# Patient Record
Sex: FEMALE | Race: WHITE | NOT HISPANIC OR LATINO | Employment: FULL TIME | URBAN - NONMETROPOLITAN AREA
[De-identification: names, ages, dates, MRNs, and addresses within clinical notes are randomized per-mention and may not be internally consistent; named-entity substitution may affect disease eponyms.]

---

## 2024-10-16 ENCOUNTER — LAB (OUTPATIENT)
Dept: LAB | Facility: HOSPITAL | Age: 54
End: 2024-10-16
Payer: COMMERCIAL

## 2024-10-16 ENCOUNTER — OFFICE VISIT (OUTPATIENT)
Dept: CARDIOLOGY | Facility: CLINIC | Age: 54
End: 2024-10-16
Payer: COMMERCIAL

## 2024-10-16 VITALS
SYSTOLIC BLOOD PRESSURE: 146 MMHG | HEIGHT: 66 IN | WEIGHT: 206 LBS | OXYGEN SATURATION: 97 % | DIASTOLIC BLOOD PRESSURE: 80 MMHG | BODY MASS INDEX: 33.11 KG/M2 | HEART RATE: 93 BPM

## 2024-10-16 DIAGNOSIS — R42 DIZZINESS: ICD-10-CM

## 2024-10-16 DIAGNOSIS — R06.02 SOB (SHORTNESS OF BREATH): ICD-10-CM

## 2024-10-16 DIAGNOSIS — F17.200 SMOKER: ICD-10-CM

## 2024-10-16 DIAGNOSIS — R07.2 PRECORDIAL PAIN: Primary | ICD-10-CM

## 2024-10-16 DIAGNOSIS — R07.2 PRECORDIAL PAIN: ICD-10-CM

## 2024-10-16 DIAGNOSIS — E78.2 MIXED HYPERLIPIDEMIA: ICD-10-CM

## 2024-10-16 LAB — D DIMER PPP FEU-MCNC: 2.61 MCGFEU/ML (ref 0–0.54)

## 2024-10-16 PROCEDURE — 93000 ELECTROCARDIOGRAM COMPLETE: CPT | Performed by: INTERNAL MEDICINE

## 2024-10-16 PROCEDURE — 85379 FIBRIN DEGRADATION QUANT: CPT

## 2024-10-16 PROCEDURE — 36415 COLL VENOUS BLD VENIPUNCTURE: CPT

## 2024-10-16 PROCEDURE — 99204 OFFICE O/P NEW MOD 45 MIN: CPT | Performed by: INTERNAL MEDICINE

## 2024-10-16 RX ORDER — CETIRIZINE HYDROCHLORIDE 10 MG/1
10 TABLET ORAL DAILY
COMMUNITY

## 2024-10-16 RX ORDER — SENNOSIDES 8.6 MG
650 CAPSULE ORAL EVERY 8 HOURS PRN
COMMUNITY

## 2024-10-16 RX ORDER — GINSENG 100 MG
CAPSULE ORAL
COMMUNITY

## 2024-10-16 RX ORDER — COVID-19 ANTIGEN TEST
KIT MISCELLANEOUS
COMMUNITY

## 2024-10-16 RX ORDER — DIMENHYDRINATE 50 MG
TABLET ORAL DAILY
COMMUNITY

## 2024-10-16 NOTE — PROGRESS NOTES
Subjective   Van Hamilton is a 54 y.o. female     Chief Complaint   Patient presents with    Establish Care     Here for eval. Self referral    Chest Pain    Shortness of Breath       PROBLEM LIST:     Chest Pain  SOB  Hyperlipidemia  Smoker    Denies Rheumatic / Scarlet fever    Specialty Problems    None        HPI:    Mrs. Van hamilton is a 54-year-old patient who is self-referred for evaluation of chest pain and shortness of breath.    Ms. Craven described the new onset of chest pain approximately 3 weeks ago.  She was at rest when she developed upper retrosternal chest discomfort that she describes as heaviness or pressure with a sense of tightness or constriction.  This was associated with numbness in the ulnar distribution of the left arm.  She had waxing and waning chest discomfort without obvious precipitating or ameliorating factors for 3 days.  There was minimal improvement over that time.  On the third day of chest discomfort she was given nitroglycerin by a friend and her symptoms resolved within 1/2-hour.  Chest pain was nonexertional, nonexertional, nonpleuritic.  And there was no accompanying nausea, diaphoresis, or shortness of breath.    The patient describes intermittent lightheaded and dizziness but no vertigo, presyncope, or syncope.  She does not sense palpitations.  She has mild intermittent lower extremity edema.  None of the symptoms seem to correlate with the chest discomfort described above.  However, the patient does relate that she tends to be dizzy when she has not eaten for prolonged periods                  PRIOR MEDICATIONS    Current Outpatient Medications on File Prior to Visit   Medication Sig Dispense Refill    acetaminophen (Tylenol 8 Hour Arthritis Pain) 650 MG 8 hr tablet Take 1 tablet by mouth Every 8 (Eight) Hours As Needed for Mild Pain.      Ascorbic Acid (VITAMIN C PO) Take  by mouth. Takes 2-3 daily      cetirizine (zyrTEC) 10 MG tablet Take 1 tablet by mouth Daily.       Flaxseed, Linseed, (Flax Seed Oil) 1000 MG capsule Take  by mouth Daily.      Naproxen Sodium (Aleve) 220 MG capsule Take  by mouth. prn      Unable to find 1 each 1 (One) Time. Lisandra SEO takes 2 gummies daily      Zinc 50 MG tablet Take  by mouth. Occas.       No current facility-administered medications on file prior to visit.       ALLERGIES:    Claritin [loratadine]    PAST MEDICAL HISTORY:    Past Medical History:   Diagnosis Date    Hyperlipidemia        SURGICAL HISTORY:    Past Surgical History:   Procedure Laterality Date    CHOLECYSTECTOMY      COLONOSCOPY W/ POLYPECTOMY      OTHER SURGICAL HISTORY Right     rt. arm surgery    TONSILLECTOMY AND ADENOIDECTOMY      TUMOR REMOVAL      removed from out of uterus       SOCIAL HISTORY:    Social History     Socioeconomic History    Marital status:    Tobacco Use    Smoking status: Every Day     Types: Cigarettes    Smokeless tobacco: Never    Tobacco comments:     Smokes 1/2 PPD for approx. 18   Substance and Sexual Activity    Alcohol use: Not Currently     Comment: social    Drug use: Never       FAMILY HISTORY:    Family History   Problem Relation Age of Onset    Diabetes Mother     Cancer Mother        Review of Systems   Constitutional:  Positive for fatigue. Negative for chills, diaphoresis, fever and unexpected weight change.   HENT: Negative.     Eyes:  Positive for visual disturbance (glasses prn).   Respiratory:  Positive for shortness of breath.         Denies orthopnea/PND   Cardiovascular:  Positive for chest pain and leg swelling (occas.). Negative for palpitations.   Gastrointestinal:  Negative for blood in stool (denies melena,hemoptysis), constipation and diarrhea.   Endocrine: Negative for cold intolerance and heat intolerance.   Genitourinary: Negative.    Musculoskeletal:  Positive for arthralgias and myalgias.   Skin: Negative.    Allergic/Immunologic: Positive for environmental allergies.   Neurological:  Positive for dizziness  "and light-headedness. Negative for syncope.   Hematological: Negative.    Psychiatric/Behavioral:  Positive for sleep disturbance.        VISIT VITALS:  Vitals:    10/16/24 1250   BP: 146/80   BP Location: Left arm   Patient Position: Sitting   Pulse: 93   SpO2: 97%   Weight: 93.4 kg (206 lb)   Height: 166.6 cm (65.6\")      /80 (BP Location: Left arm, Patient Position: Sitting)   Pulse 93   Ht 166.6 cm (65.6\")   Wt 93.4 kg (206 lb)   SpO2 97%   BMI 33.66 kg/m²     RECENT LABS:    Objective       Physical Exam  Vitals and nursing note reviewed.   Constitutional:       General: She is not in acute distress.     Appearance: She is well-developed.   HENT:      Head: Normocephalic and atraumatic.   Eyes:      Conjunctiva/sclera: Conjunctivae normal.      Pupils: Pupils are equal, round, and reactive to light.   Neck:      Vascular: No carotid bruit, hepatojugular reflux or JVD.      Trachea: No tracheal deviation.      Comments: Nl. Carotid upstrokes  Cardiovascular:      Rate and Rhythm: Normal rate and regular rhythm.      Pulses:           Radial pulses are 2+ on the right side and 2+ on the left side.      Heart sounds: Heart sounds are distant (S1 & S2). No murmur heard.     No friction rub. Gallop present. S4 sounds present. No S3 sounds.   Pulmonary:      Effort: Pulmonary effort is normal.      Breath sounds: Normal breath sounds. No wheezing, rhonchi or rales.      Comments: Nl. Expir. Phase  Nl. Breath sound intensity      Abdominal:      General: Bowel sounds are normal. There is no distension or abdominal bruit.      Palpations: Abdomen is soft. There is no mass.      Tenderness: There is no abdominal tenderness. There is no guarding or rebound.      Comments: No organomegaly   Musculoskeletal:         General: No tenderness or deformity. Normal range of motion.      Cervical back: Normal range of motion and neck supple.      Right lower leg: No edema.      Left lower leg: Edema present.      " Comments: LLE, trace edema, palpable pedal pulses  RLE, no edema, palpable pedal pulses   Skin:     General: Skin is warm and dry.      Coloration: Skin is not pale.      Findings: No erythema or rash.   Neurological:      Mental Status: She is alert and oriented to person, place, and time.   Psychiatric:         Behavior: Behavior normal.         Thought Content: Thought content normal.         Judgment: Judgment normal.           ECG 12 Lead    Date/Time: 10/16/2024 1:08 PM  Performed by: Armnado Humphrey MD    Authorized by: Armando Humphrey MD  Previous ECG: no previous ECG available  Comments: Sinus at 86.  Within normal limits.  No change.            Assessment & Plan   #1.  Chest pain.  The patient describes chest discomfort atypical for angina but with some features compatible with ischemia.  She is at moderate risk for coronary artery disease.  Therefore we we will utilize pharmacologic stress testing for restratification and to direct therapy.  As discomfort is atypical we will not start empiric therapy.    2.  Progressive exertional dyspnea.  This may be an anginal equivalent could be related to chronic lung disease given the patient's smoking history.  I would like to obtain an echocardiogram to assess LV systolic and diastolic performance, LV filling pressures, and pulmonary pressures given dyspnea, and if there is no obvious cardiac source of dyspnea I think the patient should be considered for pulmonary evaluation.    3.  Dizziness.  We will perform 14-day event monitoring to assess if there is any dysrhythmia associated with the patient's current symptoms.    4.  Elevated blood pressure.  Blood pressures are mildly elevated today but the patient states they are typically normal when checked at home.  I have asked her to check blood pressures and record results for review at the time of the patient's next visit.    4.  The patient understands to activate emergency medical services for any  worsening of prior chest discomfort we will plan on seeing her in follow-up immediately after testing or on appearing basis as discussed.   Diagnosis Plan   1. Precordial pain        2. SOB (shortness of breath)        3. Mixed hyperlipidemia        4. Smoker            No follow-ups on file.         Van Pelaez  reports that she has been smoking cigarettes. She has never used smokeless tobacco. I have educated her on the risk of diseases from using tobacco products such as cancer, COPD, and heart disease.     I advised her to quit and she is not willing to quit.    I spent 3  minutes counseling the patient.          BMI is >= 30 and <35. (Class 1 Obesity). The following options were offered after discussion;: pcp addressing             Electronically signed by:    Scribed for Armando Humphrey MD by Radha Moore LPN on October 16, 2024  at 13:07 EDT    I, Armando Humphrey MD personally performed the services described in this documentation as scribed by the above named individual in my presence, and it is both accurate and complete. October 16, 2024 13:07 EDT      Dictated Utilizing Dragon Dictation: Part of this note may be an electronic transcription/translation of spoken language to printed text using the Dragon Dictation System.

## 2024-10-17 ENCOUNTER — TELEPHONE (OUTPATIENT)
Dept: CARDIOLOGY | Facility: CLINIC | Age: 54
End: 2024-10-17
Payer: COMMERCIAL

## 2024-10-17 NOTE — TELEPHONE ENCOUNTER
Caller: Van Pelaez    Relationship: Self    Best call back number: [phone]    What is the best time to reach you: ANY    What was the call regarding: PATIENT HAS SOME QUESTIONS ABOUT HER MONITOR. PLEASE CALL HER ASAP TO DISCUSS/ADVISE.    Is it okay if the provider responds through MyChart: NO

## 2024-10-17 NOTE — TELEPHONE ENCOUNTER
Patient just was worried her phone device , told her just to charge and it will reconnect to her device. She may need extra pads, I told her she can come by the office to get more if needed.

## 2024-10-21 ENCOUNTER — TELEPHONE (OUTPATIENT)
Dept: CARDIOLOGY | Facility: CLINIC | Age: 54
End: 2024-10-21
Payer: COMMERCIAL

## 2024-10-21 DIAGNOSIS — R07.2 PRECORDIAL PAIN: Primary | ICD-10-CM

## 2024-10-21 DIAGNOSIS — R79.89 POSITIVE D DIMER: ICD-10-CM

## 2024-10-21 DIAGNOSIS — R06.02 SOB (SHORTNESS OF BREATH): ICD-10-CM

## 2024-10-21 NOTE — TELEPHONE ENCOUNTER
Patient notified of elevated d-dimer and recommended STAT ct chest. Aware someone will call her with time. Verbalized ok. PH,LPN          ----- Message from Armando Humphrey sent at 10/21/2024  8:55 AM EDT -----  Stat CT chest PE protocol.  ----- Message -----  From: Lab, Background User  Sent: 10/16/2024   6:52 PM EDT  To: Armando Humphrey MD

## 2024-10-22 ENCOUNTER — LAB (OUTPATIENT)
Dept: LAB | Facility: HOSPITAL | Age: 54
End: 2024-10-22
Payer: COMMERCIAL

## 2024-10-22 DIAGNOSIS — E78.2 MIXED HYPERLIPIDEMIA: ICD-10-CM

## 2024-10-22 LAB
ALBUMIN SERPL-MCNC: 4.2 G/DL (ref 3.5–5.2)
ALP SERPL-CCNC: 103 U/L (ref 39–117)
ALT SERPL W P-5'-P-CCNC: 24 U/L (ref 1–33)
AST SERPL-CCNC: 18 U/L (ref 1–32)
BILIRUB CONJ SERPL-MCNC: <0.2 MG/DL (ref 0–0.3)
BILIRUB INDIRECT SERPL-MCNC: NORMAL MG/DL
BILIRUB SERPL-MCNC: 0.4 MG/DL (ref 0–1.2)
CHOLEST SERPL-MCNC: 226 MG/DL (ref 0–200)
HDLC SERPL-MCNC: 37 MG/DL (ref 40–60)
LDLC SERPL CALC-MCNC: 163 MG/DL (ref 0–100)
LDLC/HDLC SERPL: 4.35 {RATIO}
PROT SERPL-MCNC: 7.7 G/DL (ref 6–8.5)
TRIGL SERPL-MCNC: 141 MG/DL (ref 0–150)
VLDLC SERPL-MCNC: 26 MG/DL (ref 5–40)

## 2024-10-22 PROCEDURE — 80061 LIPID PANEL: CPT

## 2024-10-22 PROCEDURE — 36415 COLL VENOUS BLD VENIPUNCTURE: CPT

## 2024-10-22 PROCEDURE — 80076 HEPATIC FUNCTION PANEL: CPT

## 2024-10-23 ENCOUNTER — TELEPHONE (OUTPATIENT)
Dept: CARDIOLOGY | Facility: CLINIC | Age: 54
End: 2024-10-23
Payer: COMMERCIAL

## 2024-10-23 NOTE — TELEPHONE ENCOUNTER
CTA CHEST NEG. FOR PE PER DR. HUMPHREY. PATIENT NOTIFIED  AND ALSO DISCUSSED SIGN. ELEVATED CHOL RESULTS AND DISCUSSED RECOMMENDATION TO START CHOL. MED. BUT PATIENT WANTS TO TRY NATURAL MEASURES TO DECREASE LEVELS AND DOESN'T WANT TO TAKE MED. STATES SHE WILL THINK ABOUT AND CALL US BACK. I DISCUSSED WITH HER IN DETAIL RISKS INVOLVED AND SHE VERBALIZED SHE UNDERSTOOD. PH,LPN          ----- Message from Armando Humphrey sent at 10/23/2024 11:22 AM EDT -----  Start Lipitor 40 mg daily and lipid/ hepatic in 8 weeks  ----- Message -----  From: Lab, Background User  Sent: 10/22/2024   2:19 PM EDT  To: Armando Humphrey MD

## 2024-10-25 ENCOUNTER — TELEPHONE (OUTPATIENT)
Dept: CARDIOLOGY | Facility: CLINIC | Age: 54
End: 2024-10-25
Payer: COMMERCIAL

## 2024-10-25 NOTE — TELEPHONE ENCOUNTER
Patient wore her heart monitor a little over a week but she is having an allergic reaction to the monitor. She would like to know what she should do, she has removed it for now. Can she leave it off or does she need to put it back on? What can she use for the rash?

## 2024-10-28 NOTE — TELEPHONE ENCOUNTER
PT CALLED BACK, SHE HAS A [employer] MONITOR, AND HAS WORN IT FOR ABOUT A WEEK. PT STATED SHE HAS BEEN WEARING THE MOST SENSITIVE STRIPS AVAILABLE AND THEY ARE STILL BREAKING OUT HER SKIN BADLY. TOLD PT I WOULD LET NATHALIE KNOW AND WE WOULD CALL WITH WHAT NATHALIE WOULD LIKE TO DO NEXT.

## 2024-10-28 NOTE — TELEPHONE ENCOUNTER
LVM for patient.    What monitor does she have? We can give her sensitive strips if she has the Arav monitor.

## 2024-10-28 NOTE — TELEPHONE ENCOUNTER
If she is worn it for a week she can go ahead and send it in and we will just see what data that we have gotten

## 2024-10-29 ENCOUNTER — TELEPHONE (OUTPATIENT)
Dept: CARDIOLOGY | Facility: CLINIC | Age: 54
End: 2024-10-29
Payer: COMMERCIAL

## 2024-10-29 NOTE — TELEPHONE ENCOUNTER
LVM for patient to call back    RELAY:    If she is worn it for a week she can go ahead and send it in and we will just see what data that we have gotten

## 2024-10-29 NOTE — TELEPHONE ENCOUNTER
Name: Van Pelaez      Relationship: Self      Best Callback Number: [phone]       HUB PROVIDED THE RELAY MESSAGE FROM THE OFFICE      PATIENT: VOICED UNDERSTANDING AND HAS NO FURTHER QUESTIONS AT THIS TIME    ADDITIONAL INFORMATION: WONT BE ABLE TO DROP THIS UNTIL TONIGHT WHEN SHE GETS HOME, JUST MAKING OFFICE AWARE.

## 2024-11-01 ENCOUNTER — TELEPHONE (OUTPATIENT)
Dept: CARDIOLOGY | Facility: CLINIC | Age: 54
End: 2024-11-01
Payer: COMMERCIAL

## 2024-11-01 NOTE — TELEPHONE ENCOUNTER
----- Message from Citlalli JONES sent at 11/1/2024  6:43 AM EDT -----  Regarding: FW:      ----- Message -----  From: Wesley Dorantes APRN  Sent: 10/30/2024  10:23 AM EDT  To: Abby Shea MA  Subject: FW:                                            CTA Chest   No PE.  Please forward to patient's PCP due to adrenal nodule and breast nodule for further evaluation and workup.  ----- Message -----  From: Citlalli Castro RegSched Rep  Sent: 10/29/2024   1:46 PM EDT  To: SOPHIA Dodd      ----- Message -----  From: Pat Garcia  Sent: 10/29/2024   1:29 PM EDT  To: Armando Humphrey MD

## 2024-11-07 ENCOUNTER — TELEPHONE (OUTPATIENT)
Dept: CARDIOLOGY | Facility: CLINIC | Age: 54
End: 2024-11-07
Payer: COMMERCIAL

## 2024-11-07 NOTE — TELEPHONE ENCOUNTER
Caller: Van Pelaez    Relationship: Self    Best call back number: [phone]    What is the best time to reach you: ANYTIME    Who are you requesting to speak with (clinical staff, provider,  specific staff member): CLINICAL    Do you know the name of the person who called: N/A    What was the call regarding: PATIENT HAS SOME QUESTIONS ABOUT UPCOMING TESTING SHE IS HAVING DONE AND WOULD LIKE A CALL FROM SOPHIA AVILA.    Is it okay if the provider responds through MyChart: YES

## 2024-11-27 ENCOUNTER — HOSPITAL ENCOUNTER (OUTPATIENT)
Dept: CARDIOLOGY | Facility: HOSPITAL | Age: 54
Discharge: HOME | End: 2024-11-27
Payer: COMMERCIAL

## 2024-11-27 VITALS — WEIGHT: 205.91 LBS | HEIGHT: 66 IN | BODY MASS INDEX: 33.09 KG/M2

## 2024-11-27 DIAGNOSIS — R06.02 SOB (SHORTNESS OF BREATH): ICD-10-CM

## 2024-11-27 DIAGNOSIS — E78.2 MIXED HYPERLIPIDEMIA: ICD-10-CM

## 2024-11-27 DIAGNOSIS — F17.200 SMOKER: ICD-10-CM

## 2024-11-27 DIAGNOSIS — R07.2 PRECORDIAL PAIN: ICD-10-CM

## 2024-11-27 LAB
AORTIC DIMENSIONLESS INDEX: 0.75 (DI)
BH CV ECHO MEAS - ACS: 1.78 CM
BH CV ECHO MEAS - AO MAX PG: 7 MMHG
BH CV ECHO MEAS - AO MEAN PG: 3.5 MMHG
BH CV ECHO MEAS - AO ROOT DIAM: 2.9 CM
BH CV ECHO MEAS - AO V2 MAX: 132.3 CM/SEC
BH CV ECHO MEAS - AO V2 VTI: 27.6 CM
BH CV ECHO MEAS - EDV(CUBED): 71.4 ML
BH CV ECHO MEAS - EF(MOD-BP): 53 %
BH CV ECHO MEAS - ESV(CUBED): 27.9 ML
BH CV ECHO MEAS - FS: 26.9 %
BH CV ECHO MEAS - IVS/LVPW: 0.94 CM
BH CV ECHO MEAS - IVSD: 0.96 CM
BH CV ECHO MEAS - LA DIMENSION: 3.2 CM
BH CV ECHO MEAS - LAT PEAK E' VEL: 7 CM/SEC
BH CV ECHO MEAS - LV MASS(C)D: 133.5 GRAMS
BH CV ECHO MEAS - LV MAX PG: 5.1 MMHG
BH CV ECHO MEAS - LV MEAN PG: 2.07 MMHG
BH CV ECHO MEAS - LV V1 MAX: 112.5 CM/SEC
BH CV ECHO MEAS - LV V1 VTI: 20.6 CM
BH CV ECHO MEAS - LVIDD: 4.1 CM
BH CV ECHO MEAS - LVIDS: 3 CM
BH CV ECHO MEAS - LVPWD: 1.03 CM
BH CV ECHO MEAS - MED PEAK E' VEL: 7 CM/SEC
BH CV ECHO MEAS - MV A MAX VEL: 83.2 CM/SEC
BH CV ECHO MEAS - MV DEC SLOPE: 401.3 CM/SEC2
BH CV ECHO MEAS - MV DEC TIME: 0.22 SEC
BH CV ECHO MEAS - MV E MAX VEL: 76.2 CM/SEC
BH CV ECHO MEAS - MV E/A: 0.92
BH CV ECHO MEAS - MV MAX PG: 4.4 MMHG
BH CV ECHO MEAS - MV MEAN PG: 1.98 MMHG
BH CV ECHO MEAS - MV P1/2T: 72.6 MSEC
BH CV ECHO MEAS - MV V2 VTI: 36.1 CM
BH CV ECHO MEAS - MVA(P1/2T): 3 CM2
BH CV ECHO MEAS - PA V2 MAX: 91.8 CM/SEC
BH CV ECHO MEAS - RAP SYSTOLE: 8 MMHG
BH CV ECHO MEAS - RV MAX PG: 1.74 MMHG
BH CV ECHO MEAS - RV V1 MAX: 65.9 CM/SEC
BH CV ECHO MEAS - RV V1 VTI: 12.9 CM
BH CV ECHO MEAS - RVDD: 3 CM
BH CV ECHO MEAS - RVSP: 12.4 MMHG
BH CV ECHO MEAS - TAPSE (>1.6): 2.6 CM
BH CV ECHO MEAS - TR MAX PG: 4.4 MMHG
BH CV ECHO MEAS - TR MAX VEL: 105.3 CM/SEC
BH CV ECHO MEASUREMENTS AVERAGE E/E' RATIO: 10.89
BH CV REST NUCLEAR ISOTOPE DOSE: 10 MCI
BH CV STRESS COMMENTS STAGE 1: NORMAL
BH CV STRESS DOSE REGADENOSON STAGE 1: 0.4
BH CV STRESS DURATION MIN STAGE 1: 0
BH CV STRESS DURATION SEC STAGE 1: 10
BH CV STRESS NUCLEAR ISOTOPE DOSE: 30 MCI
BH CV STRESS PROTOCOL 1: NORMAL
BH CV STRESS RECOVERY BP: NORMAL MMHG
BH CV STRESS RECOVERY HR: 82 BPM
BH CV STRESS STAGE 1: 1
BH CV XLRA - TDI S': 10.4 CM/SEC
MAXIMAL PREDICTED HEART RATE: 166 BPM
PERCENT MAX PREDICTED HR: 59.64 %
SINUS: 2.8 CM
STRESS BASELINE BP: NORMAL MMHG
STRESS BASELINE HR: 70 BPM
STRESS PERCENT HR: 70 %
STRESS POST PEAK BP: NORMAL MMHG
STRESS POST PEAK HR: 99 BPM
STRESS TARGET HR: 141 BPM

## 2024-11-27 PROCEDURE — 25010000002 REGADENOSON 0.4 MG/5ML SOLUTION: Performed by: INTERNAL MEDICINE

## 2024-11-27 PROCEDURE — 78452 HT MUSCLE IMAGE SPECT MULT: CPT

## 2024-11-27 PROCEDURE — A9500 TC99M SESTAMIBI: HCPCS | Performed by: INTERNAL MEDICINE

## 2024-11-27 PROCEDURE — 34310000005 TECHNETIUM SESTAMIBI: Performed by: INTERNAL MEDICINE

## 2024-11-27 PROCEDURE — 93017 CV STRESS TEST TRACING ONLY: CPT

## 2024-11-27 PROCEDURE — 93306 TTE W/DOPPLER COMPLETE: CPT

## 2024-11-27 RX ORDER — REGADENOSON 0.08 MG/ML
0.4 INJECTION, SOLUTION INTRAVENOUS
Status: COMPLETED | OUTPATIENT
Start: 2024-11-27 | End: 2024-11-27

## 2024-11-27 RX ADMIN — TECHNETIUM TC 99M SESTAMIBI 1 DOSE: 1 INJECTION INTRAVENOUS at 08:17

## 2024-11-27 RX ADMIN — TECHNETIUM TC 99M SESTAMIBI 1 DOSE: 1 INJECTION INTRAVENOUS at 09:53

## 2024-11-27 RX ADMIN — REGADENOSON 0.4 MG: 0.08 INJECTION, SOLUTION INTRAVENOUS at 09:53

## 2024-12-05 LAB
BH CV REST NUCLEAR ISOTOPE DOSE: 10 MCI
BH CV STRESS COMMENTS STAGE 1: NORMAL
BH CV STRESS DOSE REGADENOSON STAGE 1: 0.4
BH CV STRESS DURATION MIN STAGE 1: 0
BH CV STRESS DURATION SEC STAGE 1: 10
BH CV STRESS NUCLEAR ISOTOPE DOSE: 30 MCI
BH CV STRESS PROTOCOL 1: NORMAL
BH CV STRESS RECOVERY BP: NORMAL MMHG
BH CV STRESS RECOVERY HR: 82 BPM
BH CV STRESS STAGE 1: 1
MAXIMAL PREDICTED HEART RATE: 166 BPM
PERCENT MAX PREDICTED HR: 59.64 %
STRESS BASELINE BP: NORMAL MMHG
STRESS BASELINE HR: 70 BPM
STRESS PERCENT HR: 70 %
STRESS POST PEAK BP: NORMAL MMHG
STRESS POST PEAK HR: 99 BPM
STRESS TARGET HR: 141 BPM

## 2024-12-08 LAB
AORTIC DIMENSIONLESS INDEX: 0.75 (DI)
BH CV ECHO MEAS - ACS: 1.78 CM
BH CV ECHO MEAS - AO MAX PG: 7 MMHG
BH CV ECHO MEAS - AO MEAN PG: 3.5 MMHG
BH CV ECHO MEAS - AO ROOT DIAM: 2.9 CM
BH CV ECHO MEAS - AO V2 MAX: 132.3 CM/SEC
BH CV ECHO MEAS - AO V2 VTI: 27.6 CM
BH CV ECHO MEAS - EDV(CUBED): 71.4 ML
BH CV ECHO MEAS - EF(MOD-BP): 53 %
BH CV ECHO MEAS - ESV(CUBED): 27.9 ML
BH CV ECHO MEAS - FS: 26.9 %
BH CV ECHO MEAS - IVS/LVPW: 0.94 CM
BH CV ECHO MEAS - IVSD: 0.96 CM
BH CV ECHO MEAS - LA DIMENSION: 3.2 CM
BH CV ECHO MEAS - LAT PEAK E' VEL: 7 CM/SEC
BH CV ECHO MEAS - LV MASS(C)D: 133.5 GRAMS
BH CV ECHO MEAS - LV MAX PG: 5.1 MMHG
BH CV ECHO MEAS - LV MEAN PG: 2.07 MMHG
BH CV ECHO MEAS - LV V1 MAX: 112.5 CM/SEC
BH CV ECHO MEAS - LV V1 VTI: 20.6 CM
BH CV ECHO MEAS - LVIDD: 4.1 CM
BH CV ECHO MEAS - LVIDS: 3 CM
BH CV ECHO MEAS - LVPWD: 1.03 CM
BH CV ECHO MEAS - MED PEAK E' VEL: 7 CM/SEC
BH CV ECHO MEAS - MV A MAX VEL: 83.2 CM/SEC
BH CV ECHO MEAS - MV DEC SLOPE: 401.3 CM/SEC2
BH CV ECHO MEAS - MV DEC TIME: 0.22 SEC
BH CV ECHO MEAS - MV E MAX VEL: 76.2 CM/SEC
BH CV ECHO MEAS - MV E/A: 0.92
BH CV ECHO MEAS - MV MAX PG: 4.4 MMHG
BH CV ECHO MEAS - MV MEAN PG: 1.98 MMHG
BH CV ECHO MEAS - MV P1/2T: 72.6 MSEC
BH CV ECHO MEAS - MV V2 VTI: 36.1 CM
BH CV ECHO MEAS - MVA(P1/2T): 3 CM2
BH CV ECHO MEAS - PA V2 MAX: 91.8 CM/SEC
BH CV ECHO MEAS - RAP SYSTOLE: 8 MMHG
BH CV ECHO MEAS - RV MAX PG: 1.74 MMHG
BH CV ECHO MEAS - RV V1 MAX: 65.9 CM/SEC
BH CV ECHO MEAS - RV V1 VTI: 12.9 CM
BH CV ECHO MEAS - RVDD: 3 CM
BH CV ECHO MEAS - RVSP: 12.4 MMHG
BH CV ECHO MEAS - TAPSE (>1.6): 2.6 CM
BH CV ECHO MEAS - TR MAX PG: 4.4 MMHG
BH CV ECHO MEAS - TR MAX VEL: 105.3 CM/SEC
BH CV ECHO MEASUREMENTS AVERAGE E/E' RATIO: 10.89
BH CV XLRA - TDI S': 10.4 CM/SEC
SINUS: 2.8 CM

## 2024-12-09 ENCOUNTER — TELEPHONE (OUTPATIENT)
Dept: CARDIOLOGY | Facility: CLINIC | Age: 54
End: 2024-12-09
Payer: COMMERCIAL

## 2024-12-09 NOTE — TELEPHONE ENCOUNTER
STRESS/ECHO  Pt notified of no acute findings. Provider will discuss results at f/u. Pt reminded of appt date and time.  ----- Message from Armando Humphrey sent at 12/9/2024 12:54 PM EST -----  Keep follow-up appointment as scheduled  ----- Message -----  From: Armando Humphrey MD  Sent: 12/5/2024   8:09 PM EST  To: Armando Humphrey MD

## 2025-01-13 ENCOUNTER — TELEPHONE (OUTPATIENT)
Dept: CARDIOLOGY | Facility: CLINIC | Age: 55
End: 2025-01-13
Payer: COMMERCIAL

## 2025-01-13 NOTE — TELEPHONE ENCOUNTER
MONITOR  Pt notified of no acute findings. Provider will discuss results at f/u. Pt reminded of appt date and time.  ----- Message from Armando Humphrey sent at 1/13/2025 12:42 PM EST -----  Keep follow-up appointment as scheduled  ----- Message -----  From: Armando Humphrey MD  Sent: 1/6/2025   8:02 PM EST  To: Armando Humphrey MD

## 2025-01-22 ENCOUNTER — TELEPHONE (OUTPATIENT)
Dept: CARDIOLOGY | Facility: CLINIC | Age: 55
End: 2025-01-22
Payer: COMMERCIAL

## 2025-01-22 NOTE — TELEPHONE ENCOUNTER
Caller: Van Pelaez    Relationship: Self    Best call back number: [phone]    Caller requesting test results:     What test was performed: STRESS TEST, CT, ECHO    When was the test performed: OCT OR NOV    [facility]    Additional notes:

## 2025-01-24 NOTE — TELEPHONE ENCOUNTER
Per chart review:  Radha Moore LPN       10/23/24  3:36 PM  Note     CTA CHEST NEG. FOR PE PER DR. HARRIS. PATIENT NOTIFIED  AND ALSO DISCUSSED SIGN. ELEVATED CHOL RESULTS AND DISCUSSED RECOMMENDATION TO START CHOL. MED. BUT PATIENT WANTS TO TRY NATURAL MEASURES TO DECREASE LEVELS AND DOESN'T WANT TO TAKE MED. STATES SHE WILL THINK ABOUT AND CALL US BACK. I DISCUSSED WITH HER IN DETAIL RISKS INVOLVED AND SHE VERBALIZED SHE UNDERSTOOD. PH,VIKI                        Another encounter from 11/1/24 states:    From: Wesley Dorantes APRN  Sent: 10/30/2024  10:23 AM EDT  To: Abby Shea MA  Subject: FW:                                            CTA Chest   No PE.  Please forward to patient's PCP due to adrenal nodule and breast nodule for further evaluation and workup.    Juan A Velasquez MA       11/4/24  8:34 AM  Note     Patient notified.              First attempt to reach pt. Left a voicemail for pt to return my call at [phone].     RELAY    CTA results were given on 10/30/24 and 11/4/24. Results were also sent to pt's primary care provider at that time. There was no evidence of a pulmonary embolism; however, there was an incidental finding of an adrenal and breast nodule. Pt was advised to discuss w/ PCP.

## 2025-08-26 ENCOUNTER — TRANSCRIBE ORDERS (OUTPATIENT)
Dept: ADMINISTRATIVE | Facility: HOSPITAL | Age: 55
End: 2025-08-26
Payer: COMMERCIAL